# Patient Record
Sex: FEMALE | Race: WHITE
[De-identification: names, ages, dates, MRNs, and addresses within clinical notes are randomized per-mention and may not be internally consistent; named-entity substitution may affect disease eponyms.]

---

## 2017-10-20 ENCOUNTER — HOSPITAL ENCOUNTER (OUTPATIENT)
Dept: HOSPITAL 62 - OD | Age: 32
End: 2017-10-20
Attending: PHYSICIAN ASSISTANT
Payer: MEDICAID
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
## 2017-10-20 DIAGNOSIS — S93.401A: Primary | ICD-10-CM
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
## 2017-10-20 DIAGNOSIS — S93.601A: ICD-10-CM
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
## 2017-10-20 NOTE — RADIOLOGY REPORT (SQ)
EXAM DESCRIPTION:  FOOT RIGHT COMPLETE



COMPLETED DATE/TIME:  10/20/2017 12:40 pm



REASON FOR STUDY:  SPRAIN OF UNSP. LIGAMENT OF RT ANKLE, /RT FOOT S93.401A  SPRAIN OF UNSPECIFIED LIG
AMENT OF RIGHT ANKLE, INIT S93.601A  UNSPECIFIED SPRAIN OF RIGHT FOOT, INITIAL ENCOUNTER



COMPARISON:  None.



NUMBER OF VIEWS:  Three views.



TECHNIQUE:  AP, lateral and oblique  radiographic images acquired of the right foot.



LIMITATIONS:  None.



FINDINGS:  MINERALIZATION: Normal.

BONES: No acute fracture or dislocation.  No worrisome bone lesions.

JOINTS: No effusions.

SOFT TISSUES: No soft tissue swelling.  No foreign body.

OTHER: No other significant finding.



IMPRESSION:  NEGATIVE STUDY OF THE RIGHT FOOT. NO RADIOGRAPHIC EVIDENCE OF ACUTE INJURY.



TECHNICAL DOCUMENTATION:  JOB ID:  [id number]

 2011 [employer]- All Rights Reserved

## 2017-10-20 NOTE — RADIOLOGY REPORT (SQ)
EXAM DESCRIPTION:  ANKLE RIGHT COMPLETE



COMPLETED DATE/TIME:  10/20/2017 12:39 pm



REASON FOR STUDY:  SPRAIN OF UNSP. LIGAMENT OF RT ANKLE, /RT FOOT S93.401A  SPRAIN OF UNSPECIFIED LIG
AMENT OF RIGHT ANKLE, INIT S93.601A  UNSPECIFIED SPRAIN OF RIGHT FOOT, INITIAL ENCOUNTER



COMPARISON:  None.



NUMBER OF VIEWS:  Three views.



TECHNIQUE:  AP, lateral, and oblique radiographic images acquired of the right ankle.



LIMITATIONS:  None.



FINDINGS:  MINERALIZATION: Normal.

BONES: No acute fracture or dislocation.  No worrisome bone lesions.

JOINTS: No effusions.

SOFT TISSUES: No soft tissue swelling. No foreign body.

OTHER: No other significant finding.



IMPRESSION:  NEGATIVE STUDY OF THE RIGHT ANKLE. NO RADIOGRAPHIC EVIDENCE OF ACUTE INJURY.



TECHNICAL DOCUMENTATION:  JOB ID:  [id number]

 2011 [employer]- All Rights Reserved

## 2019-06-20 ENCOUNTER — HOSPITAL ENCOUNTER (EMERGENCY)
Dept: HOSPITAL 62 - ER | Age: 34
Discharge: LEFT BEFORE DISCHARGE | End: 2019-06-20
Payer: MEDICAID

## 2019-06-20 VITALS — DIASTOLIC BLOOD PRESSURE: 86 MMHG | SYSTOLIC BLOOD PRESSURE: 124 MMHG

## 2019-06-20 DIAGNOSIS — Z98.51: ICD-10-CM

## 2019-06-20 DIAGNOSIS — I10: ICD-10-CM

## 2019-06-20 DIAGNOSIS — N93.8: Primary | ICD-10-CM

## 2019-06-20 LAB
ADD MANUAL DIFF: NO
ALBUMIN SERPL-MCNC: 4.2 G/DL (ref 3.5–5)
ALP SERPL-CCNC: 73 U/L (ref 38–126)
ALT SERPL-CCNC: 25 U/L (ref 9–52)
ANION GAP SERPL CALC-SCNC: 8 MMOL/L (ref 5–19)
APPEARANCE UR: CLEAR
APTT PPP: (no result) S
AST SERPL-CCNC: 19 U/L (ref 14–36)
BASOPHILS # BLD AUTO: 0.1 10^3/UL (ref 0–0.2)
BASOPHILS NFR BLD AUTO: 0.5 % (ref 0–2)
BILIRUB DIRECT SERPL-MCNC: 0.2 MG/DL (ref 0–0.4)
BILIRUB SERPL-MCNC: 0.2 MG/DL (ref 0.2–1.3)
BILIRUB UR QL STRIP: NEGATIVE
BUN SERPL-MCNC: 11 MG/DL (ref 7–20)
CALCIUM: 10 MG/DL (ref 8.4–10.2)
CHLORIDE SERPL-SCNC: 102 MMOL/L (ref 98–107)
CO2 SERPL-SCNC: 31 MMOL/L (ref 22–30)
EOSINOPHIL # BLD AUTO: 0.1 10^3/UL (ref 0–0.6)
EOSINOPHIL NFR BLD AUTO: 0.8 % (ref 0–6)
ERYTHROCYTE [DISTWIDTH] IN BLOOD BY AUTOMATED COUNT: 15.3 % (ref 11.5–14)
GLUCOSE SERPL-MCNC: 95 MG/DL (ref 75–110)
GLUCOSE UR STRIP-MCNC: NEGATIVE MG/DL
HCT VFR BLD CALC: 37.8 % (ref 36–47)
HGB BLD-MCNC: 12.3 G/DL (ref 12–15.5)
KETONES UR STRIP-MCNC: NEGATIVE MG/DL
LYMPHOCYTES # BLD AUTO: 4.1 10^3/UL (ref 0.5–4.7)
LYMPHOCYTES NFR BLD AUTO: 34.1 % (ref 13–45)
MCH RBC QN AUTO: 26.8 PG (ref 27–33.4)
MCHC RBC AUTO-ENTMCNC: 32.6 G/DL (ref 32–36)
MCV RBC AUTO: 82 FL (ref 80–97)
MONOCYTES # BLD AUTO: 0.7 10^3/UL (ref 0.1–1.4)
MONOCYTES NFR BLD AUTO: 5.6 % (ref 3–13)
NEUTROPHILS # BLD AUTO: 7.1 10^3/UL (ref 1.7–8.2)
NEUTS SEG NFR BLD AUTO: 59 % (ref 42–78)
NITRITE UR QL STRIP: NEGATIVE
PH UR STRIP: 6 [PH] (ref 5–9)
PLATELET # BLD: 272 10^3/UL (ref 150–450)
POTASSIUM SERPL-SCNC: 5 MMOL/L (ref 3.6–5)
PROT SERPL-MCNC: 7.6 G/DL (ref 6.3–8.2)
PROT UR STRIP-MCNC: NEGATIVE MG/DL
RBC # BLD AUTO: 4.6 10^6/UL (ref 3.72–5.28)
SODIUM SERPL-SCNC: 140.7 MMOL/L (ref 137–145)
SP GR UR STRIP: 1.02
TOTAL CELLS COUNTED % (AUTO): 100 %
UROBILINOGEN UR-MCNC: NEGATIVE MG/DL (ref ?–2)
WBC # BLD AUTO: 12 10^3/UL (ref 4–10.5)

## 2019-06-20 PROCEDURE — 85025 COMPLETE CBC W/AUTO DIFF WBC: CPT

## 2019-06-20 PROCEDURE — 81025 URINE PREGNANCY TEST: CPT

## 2019-06-20 PROCEDURE — 36415 COLL VENOUS BLD VENIPUNCTURE: CPT

## 2019-06-20 PROCEDURE — 99284 EMERGENCY DEPT VISIT MOD MDM: CPT

## 2019-06-20 PROCEDURE — 80053 COMPREHEN METABOLIC PANEL: CPT

## 2019-06-20 PROCEDURE — 81001 URINALYSIS AUTO W/SCOPE: CPT

## 2019-06-20 NOTE — ER DOCUMENT REPORT
ED General





- General


Chief Complaint: Vaginal Bleeding


Stated Complaint: VAGINAL BLEEDING


Time Seen by Provider: 19 11:14


Mode of Arrival: Ambulatory


Information source: Patient


Notes: 





Patient presents emergency department with complaints of heavy vaginal bleeding 

for the past 5 days.  Reports she had her menses approximately 2 weeks ago.  She

started bleeding again this .  Reports she is going through a tampon 

pantiliner almost every hour.  Denies fever vomiting diarrhea.  Denies pain with

void.  Patient reports this has never happened to her before.  Denies abdominal 

pain.  Reports that when she woke up this morning the bleeding had tapered off 

but she contacted her provider and they told her to come to the emergency 

department.


TRAVEL OUTSIDE OF THE [place] IN LAST 30 DAYS: No





- HPI


Onset: Other - 


Onset/Duration: Persistent


Quality of pain: No pain


Associated symptoms: None


Exacerbated by: Denies


Relieved by: Denies


Similar symptoms previously: No


Recently seen / treated by doctor: No





- Related Data


Allergies/Adverse Reactions: 


                                        





No Known Allergies Allergy (Verified 19 10:31)


   











Past Medical History





- General


Information source: Patient


Last Menstrual Period: 2 weeks ago





- Social History


Smoking Status: Unknown if Ever Smoked


Cigarette use (# per day): Yes


Chew tobacco use (# tins/day): No


Frequency of alcohol use: None


Drug Abuse: None


Lives with: Family


Family History: Reviewed & Not Pertinent


Patient has suicidal ideation: No


Patient has homicidal ideation: No





- Past Medical History


Cardiac Medical History: Reports: Hx Hypertension


Renal/ Medical History: Denies: Hx Peritoneal Dialysis


Past Surgical History: Reports: Hx  Section, Hx Tubal Ligation





Review of Systems





- Review of Systems


Notes: 





Review HPI for review of systems., All other systems negative





Physical Exam





- Vital signs


Vitals: 


                                        











Temp Pulse Resp BP Pulse Ox


 


 98.2 F   87   14   153/94 H  98 


 


 19 10:34  19 10:34  19 10:34  19 10:34  19 10:34














- Notes


Notes: 





PHYSICAL EXAMINATION: 


GENERAL: Well-appearing and in no acute distress 


HEAD: Atraumatic, normocephalic. 


EYES: Pupils equal round   extraocular movements intact, sclera anicteric, 

conjunctiva are normal. 


ENT: nares patent,   Moist mucous membranes. 


NECK: Normal range of motion, supple without lymphadenopathy 


LUNGS: CTAB and equal. No wheezes rales or rhonchi. 


HEART: Regular rate and rhythm without murmurs 


ABDOMEN: Soft, no tenderness. No guarding, no rebound 


BACK; Denies pain


EXTREMITIES: Normal range of motion, 


NEUROLOGICAL: Cranial nerves grossly intact.  


PSYCH: Normal mood, normal affect. 


SKIN: Warm, Dry, normal turgor, no rashes or lesions noted








Course





- Re-evaluation


Re-evalutation: 





19 13:05


Still waiting for ultrasound.  Patient reports she is bleeding more.  Reports 

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 

Labs unremarkable.  Discussed this with patient.


19 13:09


Patient has decided to leave.  Ultrasound still not done.  Patient was warned of

the risk of leaving unsure of why she is having such heavy bleeding.  She 

verbalized understanding





The patient has decided not to proceed with further recommended testing or 

treatment to determine the cause of their symptoms.  The risks and alternatives 

to the recommendations were discussed and the patient was understanding.  The 

patient appears clinically to have the capacity to make this decision.  Patient 

was instructed that he/she could return to the emergency department at any time 

to complete the testing treatment.


 








- Vital Signs


Vital signs: 


                                        











Temp Pulse Resp BP Pulse Ox


 


 98.0 F   82   18   124/86 H  100 


 


 19 13:13  19 13:13  19 13:13  19 13:13  19 13:13














- Laboratory


Result Diagrams: 


                                 19 11:41





                                 19 11:41


Laboratory results interpreted by me: 


                                        











  19





  11:41 11:41 11:41


 


WBC  12.0 H  


 


MCH  26.8 L  


 


RDW  15.3 H  


 


Carbon Dioxide   31 H 


 


Urine Blood    LARGE H














Discharge





- Discharge


Clinical Impression: 


 Vaginal bleeding





Condition: Stable


Disposition: AGAINST MEDICAL ADVICE


Instructions:  Vaginal Bleeding ([facility])


Additional Instructions: 


*You have been evaluated for vaginal bleeding


*Monitor the bleeding, return if your are having increased bleeding, 1 kotex pad

per hour for several hours.


*Follow up with your OB/GYN for recheck and Ultra sound as indicated


*Avoid sexual intercourse until follow up


*Return to ED for worsening condition, changes, needs, weak, dizzy








Monitor your blood pressure. Your blood pressure was elevated today.  This may 

be because you were anxious, in pain or because you need medication.  It is 

important to follow up with your primary care provider for full evaluation.


Forms:  Elevated Blood Pressure

## 2019-09-26 ENCOUNTER — HOSPITAL ENCOUNTER (OUTPATIENT)
Dept: HOSPITAL 62 - OD | Age: 34
End: 2019-09-26
Attending: SURGERY
Payer: MEDICAID

## 2019-09-26 DIAGNOSIS — Z72.0: ICD-10-CM

## 2019-09-26 DIAGNOSIS — E66.01: Primary | ICD-10-CM

## 2019-09-26 PROCEDURE — 36415 COLL VENOUS BLD VENIPUNCTURE: CPT

## 2019-09-26 PROCEDURE — G0480 DRUG TEST DEF 1-7 CLASSES: HCPCS

## 2019-09-26 PROCEDURE — 80323 ALKALOIDS NOS: CPT

## 2021-01-04 ENCOUNTER — HOSPITAL ENCOUNTER (EMERGENCY)
Dept: HOSPITAL 62 - ER | Age: 36
Discharge: HOME | End: 2021-01-04
Payer: MEDICAID

## 2021-01-04 VITALS — SYSTOLIC BLOOD PRESSURE: 140 MMHG | DIASTOLIC BLOOD PRESSURE: 89 MMHG

## 2021-01-04 DIAGNOSIS — I10: ICD-10-CM

## 2021-01-04 DIAGNOSIS — S83.91XA: Primary | ICD-10-CM

## 2021-01-04 DIAGNOSIS — Z79.899: ICD-10-CM

## 2021-01-04 DIAGNOSIS — F17.200: ICD-10-CM
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
## 2021-01-04 PROCEDURE — 96372 THER/PROPH/DIAG INJ SC/IM: CPT

## 2021-01-04 PROCEDURE — 99284 EMERGENCY DEPT VISIT MOD MDM: CPT

## 2021-01-04 PROCEDURE — 73564 X-RAY EXAM KNEE 4 OR MORE: CPT

## 2021-01-04 NOTE — ER DOCUMENT REPORT
ED Extremity Problem, Lower





- General


Chief Complaint: Leg Injury


Stated Complaint: RIGHT LEG PAIN


Time Seen by Provider: 21 20:05


Primary Care Provider: 


SINDI BULLOCK MD [Primary Care Provider] - Follow up as needed


Mode of Arrival: Ambulatory


Information source: Patient


TRAVEL OUTSIDE OF THE [place] IN LAST 30 DAYS: No





- HPI


Patient complains to provider of: Injury


Location: Knee, Leg


Notes: 





Patient with complaints of right posterior knee/hamstring pain.  The patient 

states that she was standing up and twisting when she felt a pop in the right 

posterior knee that went up her hamstring/mid thigh.  No trauma or fall.  No 

fever.  No redness.  She denies numbness, tingling, weakness.  No chest pain or 

shortness of breath.  No abdominal pain.  No nausea, vomiting, diarrhea.  

Patient is on Suboxone.  She states the pain is moderate to severe, constant, 

worse with movement and ambulation, better with rest.  No other injuries or 

complaints at this time.





- Related Data


Allergies/Adverse Reactions: 


                                        





No Known Allergies Allergy (Verified 21 19:57)


   








Home Medications: FLEXIRIL.  CONCERTA.  SUBOXONE.  AMATEXA





Past Medical History





- Social History


Smoking Status: Current Every Day Smoker


Frequency of alcohol use: None


Drug Abuse: None


Family History: Reviewed & Not Pertinent





- Past Medical History


Cardiac Medical History: Reports: Hx Hypertension


Renal/ Medical History: Denies: Hx Peritoneal Dialysis


Past Surgical History: Reports: Hx  Section, Hx Tubal Ligation





Review of Systems





- Review of Systems


-: Yes All other systems reviewed and negative





Physical Exam





- Vital signs


Vitals: 


                                        











Temp Pulse Resp BP Pulse Ox


 


 98.5 F   101 H  20   141/96 H  98 


 


 21 19:01  21 19:01  21 19:01  21 19:01  21 19:01














- Notes


Notes: 


GENERAL: alert, cooperative, nontoxic, no distress.


HEAD: normocephalic, atraumatic


EYES: conjunctiva pink without discharge, no external redness or swelling.


EARS: no external swelling, no external redness


NOSE: atraumatic, no external swelling


MOUTH/THROAT: mucous membranes moist and pink


NECK: soft, supple, full range of motion, no meningismus.


CHEST: no distress, lungs clear and equal throughout.  No wheezing, rales, 

rhonchi.


CARDIAC: regular rate and rhythm, no murmur, normal capillary refill, normal 

pulses.  


BACK: full range of motion, no CVA tenderness.


EXTREMITIES: full range of motion of all extremities.  No redness, no swelling. 

Tenderness to palpation to the right medial and posterior knee as well as the 

right distal hamstring area.  No bruising, no hematoma.  No redness or swelling.

 Normal pulse and sensation distally.  No obvious ligament stability.  Normal 

anterior and posterior drawer.


NEURO: alert and oriented 3, no focal deficits, full range of motion of all 

extremities.


PYSCH: appropriate mood, affect.  Patient is cooperative.


SKIN: pink, warm, dry, no rash.








Course





- Re-evaluation


Re-evalutation: 





21 21:38


Patient resting comfortably at this time.  Have gone over the results with the 

patient.  Questions have been answered.  X-rays negative.  Patient requesting 

crutches.





Patient is nontoxic-appearing with stable vitals.  Here with complaints of right

knee/hamstring pain.  She states that she twisted and felt a pop in her medial 

knee/hamstring area.  No fever.  On exam she had tenderness to palpation along 

the medial knee in the distal hamstring.  There is no redness, ecchymosis.  

Normal pulse and sensation distally.  No redness or signs of infection.  X-rays 

negative for acute findings.  Patient declined Ace wrap or knee immobilizer.  

She states that she would like some crutches.  Patient will be discharged home 

with a prescription for Toradol, she is currently on Suboxone.  She instructed t

o rest, ice, elevate.  Follow-up with orthopedics if she continues to have pain,

sooner for worsening pain, fever, numbness, tingling, weakness, redness, any 

further concerns.





- Vital Signs


Vital signs: 


                                        











Temp Pulse Resp BP Pulse Ox


 


 98.5 F   101 H  20   141/96 H  98 


 


 21 19:01  21 19:01  21 19:21 19:21 19:01














- Laboratory Results


Critical Laboratory Results Reviewed: No Critical Results





- Radiology Results


Critical Radiology Results Reviewed: No Critical Results





Discharge





- Discharge


Clinical Impression: 


Sprain of right knee


Qualifiers:


 Encounter type: initial encounter Involved ligament of knee: unspecified 

ligament Qualified Code(s): S83.91XA - Sprain of unspecified site of right knee,

initial encounter





Condition: Stable


Disposition: HOME, SELF-CARE


Instructions:  Use of Crutches ([facility]), Ice & Elevation ([facility]), Sprained Knee ([facility])


Additional Instructions: 


Take medications as needed.  You may also take Tylenol as needed for pain.  

Rest, ice, elevate.  Use crutches as needed for comfort.  Follow-up with Ortho 

if not better in the next 5 to 7 days, sooner for worsening pain, fever, 

redness, numbness, tingling, weakness, any further concerns.


Prescriptions: 


Ketorolac Tromethamine [Toradol 10 mg Tablet] 10 mg PO Q6HP PRN #21 tablet


 PRN Reason: 


Forms:  Elevated Blood Pressure


Referrals: 


SINDI BULLOCK MD [Primary Care Provider] - Follow up as needed


REGGIE GUZMAN DO [ACTIVE STAFF] - Follow up as needed

## 2021-01-04 NOTE — RADIOLOGY REPORT (SQ)
EXAM DESCRIPTION: 



XR KNEE 4 OR MORE VIEWS



COMPLETED DATE/TME:  01/04/2021 20:31



CLINICAL HISTORY: 



35 years, Female, pain, felt a pop



COMPARISON:

None.





TECHNIQUE:

Four views of the right knee.





FINDINGS:



No evidence for fracture dislocation. No suspicious bone or joint

space abnormality. No suspicious suprapatellar joint effusion.



IMPRESSION:



Unremarkable right knee series.